# Patient Record
Sex: MALE
[De-identification: names, ages, dates, MRNs, and addresses within clinical notes are randomized per-mention and may not be internally consistent; named-entity substitution may affect disease eponyms.]

---

## 2018-04-02 ENCOUNTER — APPOINTMENT (OUTPATIENT)
Dept: SURGICAL ONCOLOGY | Facility: CLINIC | Age: 48
End: 2018-04-02
Payer: COMMERCIAL

## 2018-04-02 VITALS
HEART RATE: 79 BPM | DIASTOLIC BLOOD PRESSURE: 75 MMHG | OXYGEN SATURATION: 98 % | RESPIRATION RATE: 16 BRPM | BODY MASS INDEX: 42.66 KG/M2 | HEIGHT: 72 IN | SYSTOLIC BLOOD PRESSURE: 150 MMHG | WEIGHT: 315 LBS

## 2018-04-02 DIAGNOSIS — Z87.01 PERSONAL HISTORY OF PNEUMONIA (RECURRENT): ICD-10-CM

## 2018-04-02 DIAGNOSIS — Z80.1 FAMILY HISTORY OF MALIGNANT NEOPLASM OF TRACHEA, BRONCHUS AND LUNG: ICD-10-CM

## 2018-04-02 DIAGNOSIS — Z87.19 PERSONAL HISTORY OF OTHER DISEASES OF THE DIGESTIVE SYSTEM: ICD-10-CM

## 2018-04-02 DIAGNOSIS — Z87.891 PERSONAL HISTORY OF NICOTINE DEPENDENCE: ICD-10-CM

## 2018-04-02 DIAGNOSIS — R73.03 PREDIABETES.: ICD-10-CM

## 2018-04-02 DIAGNOSIS — Z80.8 FAMILY HISTORY OF MALIGNANT NEOPLASM OF OTHER ORGANS OR SYSTEMS: ICD-10-CM

## 2018-04-02 DIAGNOSIS — Z86.69 PERSONAL HISTORY OF OTHER DISEASES OF THE NERVOUS SYSTEM AND SENSE ORGANS: ICD-10-CM

## 2018-04-02 PROCEDURE — 99244 OFF/OP CNSLTJ NEW/EST MOD 40: CPT

## 2018-04-09 ENCOUNTER — RESULT REVIEW (OUTPATIENT)
Age: 48
End: 2018-04-09

## 2018-04-30 ENCOUNTER — OUTPATIENT (OUTPATIENT)
Dept: OUTPATIENT SERVICES | Facility: HOSPITAL | Age: 48
LOS: 1 days | End: 2018-04-30
Payer: COMMERCIAL

## 2018-04-30 VITALS
SYSTOLIC BLOOD PRESSURE: 150 MMHG | DIASTOLIC BLOOD PRESSURE: 70 MMHG | RESPIRATION RATE: 16 BRPM | WEIGHT: 315 LBS | HEART RATE: 88 BPM | HEIGHT: 72 IN | TEMPERATURE: 99 F | OXYGEN SATURATION: 98 %

## 2018-04-30 DIAGNOSIS — C43.9 MALIGNANT MELANOMA OF SKIN, UNSPECIFIED: ICD-10-CM

## 2018-04-30 DIAGNOSIS — D22.5 MELANOCYTIC NEVI OF TRUNK: Chronic | ICD-10-CM

## 2018-04-30 DIAGNOSIS — Z98.890 OTHER SPECIFIED POSTPROCEDURAL STATES: Chronic | ICD-10-CM

## 2018-04-30 DIAGNOSIS — Z01.818 ENCOUNTER FOR OTHER PREPROCEDURAL EXAMINATION: ICD-10-CM

## 2018-04-30 DIAGNOSIS — G47.33 OBSTRUCTIVE SLEEP APNEA (ADULT) (PEDIATRIC): ICD-10-CM

## 2018-04-30 DIAGNOSIS — C43.59 MALIGNANT MELANOMA OF OTHER PART OF TRUNK: ICD-10-CM

## 2018-04-30 LAB
ANION GAP SERPL CALC-SCNC: 12 MMOL/L — SIGNIFICANT CHANGE UP (ref 5–17)
BUN SERPL-MCNC: 13 MG/DL — SIGNIFICANT CHANGE UP (ref 7–23)
CALCIUM SERPL-MCNC: 10 MG/DL — SIGNIFICANT CHANGE UP (ref 8.4–10.5)
CHLORIDE SERPL-SCNC: 99 MMOL/L — SIGNIFICANT CHANGE UP (ref 96–108)
CO2 SERPL-SCNC: 25 MMOL/L — SIGNIFICANT CHANGE UP (ref 22–31)
CREAT SERPL-MCNC: 1.09 MG/DL — SIGNIFICANT CHANGE UP (ref 0.5–1.3)
GLUCOSE SERPL-MCNC: 97 MG/DL — SIGNIFICANT CHANGE UP (ref 70–99)
HCT VFR BLD CALC: 39.2 % — SIGNIFICANT CHANGE UP (ref 39–50)
HGB BLD-MCNC: 12.9 G/DL — LOW (ref 13–17)
LDH SERPL L TO P-CCNC: 183 U/L — SIGNIFICANT CHANGE UP (ref 50–242)
MCHC RBC-ENTMCNC: 29.1 PG — SIGNIFICANT CHANGE UP (ref 27–34)
MCHC RBC-ENTMCNC: 32.9 GM/DL — SIGNIFICANT CHANGE UP (ref 32–36)
MCV RBC AUTO: 88.3 FL — SIGNIFICANT CHANGE UP (ref 80–100)
PLATELET # BLD AUTO: 285 K/UL — SIGNIFICANT CHANGE UP (ref 150–400)
POTASSIUM SERPL-MCNC: 3.8 MMOL/L — SIGNIFICANT CHANGE UP (ref 3.5–5.3)
POTASSIUM SERPL-SCNC: 3.8 MMOL/L — SIGNIFICANT CHANGE UP (ref 3.5–5.3)
RBC # BLD: 4.44 M/UL — SIGNIFICANT CHANGE UP (ref 4.2–5.8)
RBC # FLD: 13.4 % — SIGNIFICANT CHANGE UP (ref 10.3–14.5)
SODIUM SERPL-SCNC: 136 MMOL/L — SIGNIFICANT CHANGE UP (ref 135–145)
WBC # BLD: 12.27 K/UL — HIGH (ref 3.8–10.5)
WBC # FLD AUTO: 12.27 K/UL — HIGH (ref 3.8–10.5)

## 2018-04-30 PROCEDURE — 83615 LACTATE (LD) (LDH) ENZYME: CPT

## 2018-04-30 PROCEDURE — 71046 X-RAY EXAM CHEST 2 VIEWS: CPT | Mod: 26

## 2018-04-30 PROCEDURE — 85027 COMPLETE CBC AUTOMATED: CPT

## 2018-04-30 PROCEDURE — G0463: CPT

## 2018-04-30 PROCEDURE — 80048 BASIC METABOLIC PNL TOTAL CA: CPT

## 2018-04-30 PROCEDURE — 71046 X-RAY EXAM CHEST 2 VIEWS: CPT

## 2018-04-30 RX ORDER — SODIUM CHLORIDE 9 MG/ML
3 INJECTION INTRAMUSCULAR; INTRAVENOUS; SUBCUTANEOUS EVERY 8 HOURS
Qty: 0 | Refills: 0 | Status: DISCONTINUED | OUTPATIENT
Start: 2018-06-06 | End: 2018-06-21

## 2018-04-30 RX ORDER — ACETAMINOPHEN 500 MG
1000 TABLET ORAL ONCE
Qty: 0 | Refills: 0 | Status: COMPLETED | OUTPATIENT
Start: 2018-06-06 | End: 2018-06-06

## 2018-04-30 RX ORDER — LIDOCAINE HCL 20 MG/ML
0.2 VIAL (ML) INJECTION ONCE
Qty: 0 | Refills: 0 | Status: DISCONTINUED | OUTPATIENT
Start: 2018-06-06 | End: 2018-06-21

## 2018-04-30 NOTE — H&P PST ADULT - PSH
Atypical nevus of back  3/2018   Excised:  mid back  Status post hernia repair  ' 2016   Umbilical with Mesh

## 2018-04-30 NOTE — H&P PST ADULT - HISTORY OF PRESENT ILLNESS
This is a 47 y/o male with no significant PMH.  Morbid obesity: BMI  44.1, +FARHAN per criteria, former smoker: 1ppd x 10 years. Quit '15.  Current dx: + Melanoma Mid-upper Back. Scheduled: Radical Resection of Melanoma on Back.

## 2018-04-30 NOTE — H&P PST ADULT - NSANTHOSAYNRD_GEN_A_CORE
No. FARHAN screening performed.  STOP BANG Legend: 0-2 = LOW Risk; 3-4 = INTERMEDIATE Risk; 5-8 = HIGH Risk Neck : 20 in./No. FARHAN screening performed.  STOP BANG Legend: 0-2 = LOW Risk; 3-4 = INTERMEDIATE Risk; 5-8 = HIGH Risk

## 2018-04-30 NOTE — H&P PST ADULT - PMH
Melanocytic nevus  3/2018   Atypical: excised mid-Back  Melanoma  dx: 3/2018    mid-upper Back  Obstructive sleep apnea syndrome in adult  per criteria  Umbilical hernia  ' 2016    surgically treated

## 2018-05-01 ENCOUNTER — APPOINTMENT (OUTPATIENT)
Dept: PLASTIC SURGERY | Facility: CLINIC | Age: 48
End: 2018-05-01
Payer: COMMERCIAL

## 2018-05-01 VITALS
WEIGHT: 315 LBS | SYSTOLIC BLOOD PRESSURE: 136 MMHG | HEART RATE: 93 BPM | HEIGHT: 72 IN | RESPIRATION RATE: 17 BRPM | DIASTOLIC BLOOD PRESSURE: 72 MMHG | BODY MASS INDEX: 42.66 KG/M2

## 2018-05-01 PROCEDURE — 99243 OFF/OP CNSLTJ NEW/EST LOW 30: CPT

## 2018-05-07 ENCOUNTER — APPOINTMENT (OUTPATIENT)
Dept: SURGICAL ONCOLOGY | Facility: CLINIC | Age: 48
End: 2018-05-07
Payer: COMMERCIAL

## 2018-05-07 VITALS
HEIGHT: 72 IN | DIASTOLIC BLOOD PRESSURE: 85 MMHG | RESPIRATION RATE: 16 BRPM | BODY MASS INDEX: 41.85 KG/M2 | WEIGHT: 309 LBS | OXYGEN SATURATION: 96 % | SYSTOLIC BLOOD PRESSURE: 146 MMHG | HEART RATE: 91 BPM

## 2018-05-07 DIAGNOSIS — R93.8 ABNORMAL FINDINGS ON DIAGNOSTIC IMAGING OF OTHER SPECIFIED BODY STRUCTURES: ICD-10-CM

## 2018-05-07 PROCEDURE — 99214 OFFICE O/P EST MOD 30 MIN: CPT

## 2018-05-09 ENCOUNTER — APPOINTMENT (OUTPATIENT)
Dept: SURGICAL ONCOLOGY | Facility: HOSPITAL | Age: 48
End: 2018-05-09

## 2018-05-29 ENCOUNTER — OUTPATIENT (OUTPATIENT)
Dept: OUTPATIENT SERVICES | Facility: HOSPITAL | Age: 48
LOS: 1 days | End: 2018-05-29
Payer: COMMERCIAL

## 2018-05-29 VITALS
SYSTOLIC BLOOD PRESSURE: 136 MMHG | OXYGEN SATURATION: 98 % | HEART RATE: 77 BPM | TEMPERATURE: 98 F | WEIGHT: 309.97 LBS | DIASTOLIC BLOOD PRESSURE: 85 MMHG | RESPIRATION RATE: 20 BRPM | HEIGHT: 72 IN

## 2018-05-29 DIAGNOSIS — C43.59 MALIGNANT MELANOMA OF OTHER PART OF TRUNK: ICD-10-CM

## 2018-05-29 DIAGNOSIS — G47.33 OBSTRUCTIVE SLEEP APNEA (ADULT) (PEDIATRIC): ICD-10-CM

## 2018-05-29 DIAGNOSIS — Z98.890 OTHER SPECIFIED POSTPROCEDURAL STATES: Chronic | ICD-10-CM

## 2018-05-29 DIAGNOSIS — Z01.818 ENCOUNTER FOR OTHER PREPROCEDURAL EXAMINATION: ICD-10-CM

## 2018-05-29 DIAGNOSIS — C43.9 MALIGNANT MELANOMA OF SKIN, UNSPECIFIED: ICD-10-CM

## 2018-05-29 DIAGNOSIS — J18.9 PNEUMONIA, UNSPECIFIED ORGANISM: ICD-10-CM

## 2018-05-29 DIAGNOSIS — D22.5 MELANOCYTIC NEVI OF TRUNK: Chronic | ICD-10-CM

## 2018-05-29 LAB
ANION GAP SERPL CALC-SCNC: 14 MMOL/L — SIGNIFICANT CHANGE UP (ref 5–17)
BUN SERPL-MCNC: 17 MG/DL — SIGNIFICANT CHANGE UP (ref 7–23)
CALCIUM SERPL-MCNC: 9.6 MG/DL — SIGNIFICANT CHANGE UP (ref 8.4–10.5)
CHLORIDE SERPL-SCNC: 106 MMOL/L — SIGNIFICANT CHANGE UP (ref 96–108)
CO2 SERPL-SCNC: 21 MMOL/L — LOW (ref 22–31)
CREAT SERPL-MCNC: 0.83 MG/DL — SIGNIFICANT CHANGE UP (ref 0.5–1.3)
GLUCOSE SERPL-MCNC: 101 MG/DL — HIGH (ref 70–99)
HCT VFR BLD CALC: 36.6 % — LOW (ref 39–50)
HGB BLD-MCNC: 12 G/DL — LOW (ref 13–17)
MCHC RBC-ENTMCNC: 29.3 PG — SIGNIFICANT CHANGE UP (ref 27–34)
MCHC RBC-ENTMCNC: 32.8 GM/DL — SIGNIFICANT CHANGE UP (ref 32–36)
MCV RBC AUTO: 89.5 FL — SIGNIFICANT CHANGE UP (ref 80–100)
PLATELET # BLD AUTO: 228 K/UL — SIGNIFICANT CHANGE UP (ref 150–400)
POTASSIUM SERPL-MCNC: 3.8 MMOL/L — SIGNIFICANT CHANGE UP (ref 3.5–5.3)
POTASSIUM SERPL-SCNC: 3.8 MMOL/L — SIGNIFICANT CHANGE UP (ref 3.5–5.3)
RBC # BLD: 4.09 M/UL — LOW (ref 4.2–5.8)
RBC # FLD: 14.2 % — SIGNIFICANT CHANGE UP (ref 10.3–14.5)
SODIUM SERPL-SCNC: 141 MMOL/L — SIGNIFICANT CHANGE UP (ref 135–145)
WBC # BLD: 6.2 K/UL — SIGNIFICANT CHANGE UP (ref 3.8–10.5)
WBC # FLD AUTO: 6.2 K/UL — SIGNIFICANT CHANGE UP (ref 3.8–10.5)

## 2018-05-29 PROCEDURE — 80048 BASIC METABOLIC PNL TOTAL CA: CPT

## 2018-05-29 PROCEDURE — 71046 X-RAY EXAM CHEST 2 VIEWS: CPT | Mod: 26

## 2018-05-29 PROCEDURE — 71046 X-RAY EXAM CHEST 2 VIEWS: CPT

## 2018-05-29 PROCEDURE — 85027 COMPLETE CBC AUTOMATED: CPT

## 2018-05-29 RX ORDER — FAMOTIDINE 10 MG/ML
0 INJECTION INTRAVENOUS
Qty: 0 | Refills: 0 | COMMUNITY

## 2018-05-29 NOTE — H&P PST ADULT - PSH
Atypical nevus of back  3/2018   Excised:  mid back  Status post hernia repair  2016   Umbilical with Mesh

## 2018-05-29 NOTE — H&P PST ADULT - NSANTHOSAYNRD_GEN_A_CORE
Neck : 20 in./No. FARHAN screening performed.  STOP BANG Legend: 0-2 = LOW Risk; 3-4 = INTERMEDIATE Risk; 5-8 = HIGH Risk Neck : 19.25 inches/No. FARHAN screening performed.  STOP BANG Legend: 0-2 = LOW Risk; 3-4 = INTERMEDIATE Risk; 5-8 = HIGH Risk

## 2018-05-29 NOTE — H&P PST ADULT - ATTENDING COMMENTS
D/w pt and wife.  Path slide review notes melanoma to be 1.1 mm in depth.  Given options of proceeding with resection of primary and returning for SLNB, vs canceling surgery today and rescheduling for both procedures to be done concurrently, pt wishes to proceed today with resection and will return for SLNB in 4 - 6 weeks.

## 2018-05-29 NOTE — H&P PST ADULT - HISTORY OF PRESENT ILLNESS
This is a 49 y/o male with no significant PMH.  Morbid obesity: BMI  44.1, +FARHAN per criteria, former smoker: 1ppd x 10 years. Quit '15.  Current dx: + Melanoma Mid-upper Back. Scheduled: Radical Resection of Melanoma on Back.     ****Pt initially was scheduled for May 2018, rescheduled due to a mass in right lobe, pt developed chills next day & was diagnosed with PNA - Nomi This is a 49 y/o male with no significant PMH.  Morbid obesity: BMI  44.1, +FARHAN per criteria, former smoker: 1ppd x 10 years. Quit '15.  Current dx: + Melanoma Mid-upper Back. Scheduled: Radical Resection of Melanoma on Back.     ****Pt initially was scheduled for May 2018, rescheduled due to a mass in right lobe, pt developed fever & chills next day & was diagnosed with PNA - Treated with Levaquin & prednisone. Repeat chest X-Ray  done in PST 5/29/18. *****

## 2018-05-29 NOTE — H&P PST ADULT - PMH
Melanocytic nevus  3/2018   Atypical: excised mid-Back  Melanoma  dx: 3/2018    mid-upper Back  Obstructive sleep apnea syndrome in adult  per criteria  Pneumonia  2016- hospitalized , April- may 2018- treated  Umbilical hernia  2016    surgically treated Melanocytic nevus  3/2018   Atypical: excised mid-Back  Melanoma  dx: 3/2018    mid-upper Back  Morbid obesity with BMI of 40.0-44.9, adult    Obstructive sleep apnea syndrome in adult  per criteria  Pneumonia  2016- hospitalized , April- may 2018- treated  Umbilical hernia  2016    surgically treated

## 2018-05-29 NOTE — H&P PST ADULT - RS GEN PE MLT RESP DETAILS PC
respirations non-labored/breath sounds equal/good air movement/clear to auscultation bilaterally/normal/airway patent

## 2018-06-05 ENCOUNTER — TRANSCRIPTION ENCOUNTER (OUTPATIENT)
Age: 48
End: 2018-06-05

## 2018-06-06 ENCOUNTER — OUTPATIENT (OUTPATIENT)
Dept: OUTPATIENT SERVICES | Facility: HOSPITAL | Age: 48
LOS: 1 days | End: 2018-06-06
Payer: COMMERCIAL

## 2018-06-06 ENCOUNTER — RESULT REVIEW (OUTPATIENT)
Age: 48
End: 2018-06-06

## 2018-06-06 ENCOUNTER — APPOINTMENT (OUTPATIENT)
Dept: SURGICAL ONCOLOGY | Facility: HOSPITAL | Age: 48
End: 2018-06-06

## 2018-06-06 VITALS
RESPIRATION RATE: 20 BRPM | SYSTOLIC BLOOD PRESSURE: 147 MMHG | DIASTOLIC BLOOD PRESSURE: 85 MMHG | HEIGHT: 72 IN | WEIGHT: 309.97 LBS | TEMPERATURE: 98 F | OXYGEN SATURATION: 98 % | HEART RATE: 77 BPM

## 2018-06-06 VITALS
TEMPERATURE: 97 F | OXYGEN SATURATION: 97 % | DIASTOLIC BLOOD PRESSURE: 76 MMHG | SYSTOLIC BLOOD PRESSURE: 142 MMHG | RESPIRATION RATE: 16 BRPM | HEART RATE: 64 BPM

## 2018-06-06 DIAGNOSIS — Z01.818 ENCOUNTER FOR OTHER PREPROCEDURAL EXAMINATION: ICD-10-CM

## 2018-06-06 DIAGNOSIS — Z98.890 OTHER SPECIFIED POSTPROCEDURAL STATES: Chronic | ICD-10-CM

## 2018-06-06 DIAGNOSIS — C43.59 MALIGNANT MELANOMA OF OTHER PART OF TRUNK: ICD-10-CM

## 2018-06-06 DIAGNOSIS — D22.5 MELANOCYTIC NEVI OF TRUNK: Chronic | ICD-10-CM

## 2018-06-06 PROCEDURE — 13102 CMPLX RPR TRUNK ADDL 5CM/<: CPT | Mod: LT

## 2018-06-06 PROCEDURE — 11602 EXC TR-EXT MAL+MARG 1.1-2 CM: CPT | Mod: LT

## 2018-06-06 PROCEDURE — 21935 RESECT BACK TUM < 5 CM: CPT

## 2018-06-06 PROCEDURE — 13101 CMPLX RPR TRUNK 2.6-7.5 CM: CPT | Mod: LT

## 2018-06-06 PROCEDURE — 13101 CMPLX RPR TRUNK 2.6-7.5 CM: CPT

## 2018-06-06 RX ORDER — CELECOXIB 200 MG/1
200 CAPSULE ORAL ONCE
Qty: 0 | Refills: 0 | Status: DISCONTINUED | OUTPATIENT
Start: 2018-06-06 | End: 2018-06-21

## 2018-06-06 RX ORDER — SODIUM CHLORIDE 9 MG/ML
1000 INJECTION, SOLUTION INTRAVENOUS
Qty: 0 | Refills: 0 | Status: DISCONTINUED | OUTPATIENT
Start: 2018-06-06 | End: 2018-06-21

## 2018-06-06 RX ORDER — ONDANSETRON 8 MG/1
4 TABLET, FILM COATED ORAL ONCE
Qty: 0 | Refills: 0 | Status: DISCONTINUED | OUTPATIENT
Start: 2018-06-06 | End: 2018-06-21

## 2018-06-06 RX ORDER — OXYCODONE HYDROCHLORIDE 5 MG/1
1 TABLET ORAL
Qty: 6 | Refills: 0 | OUTPATIENT
Start: 2018-06-06

## 2018-06-06 RX ORDER — OXYCODONE HYDROCHLORIDE 5 MG/1
5 TABLET ORAL ONCE
Qty: 0 | Refills: 0 | Status: DISCONTINUED | OUTPATIENT
Start: 2018-06-06 | End: 2018-06-06

## 2018-06-06 RX ORDER — CELECOXIB 200 MG/1
200 CAPSULE ORAL ONCE
Qty: 0 | Refills: 0 | Status: COMPLETED | OUTPATIENT
Start: 2018-06-06 | End: 2018-06-06

## 2018-06-06 RX ORDER — HYDROMORPHONE HYDROCHLORIDE 2 MG/ML
0.25 INJECTION INTRAMUSCULAR; INTRAVENOUS; SUBCUTANEOUS
Qty: 0 | Refills: 0 | Status: DISCONTINUED | OUTPATIENT
Start: 2018-06-06 | End: 2018-06-06

## 2018-06-06 RX ADMIN — Medication 1000 MILLIGRAM(S): at 06:12

## 2018-06-06 RX ADMIN — CELECOXIB 200 MILLIGRAM(S): 200 CAPSULE ORAL at 06:12

## 2018-06-06 NOTE — ASU PATIENT PROFILE, ADULT - PMH
Melanocytic nevus  3/2018   Atypical: excised mid-Back  Melanoma  dx: 3/2018    mid-upper Back  Morbid obesity with BMI of 40.0-44.9, adult    Obstructive sleep apnea syndrome in adult  per criteria  Pneumonia  2016- hospitalized , April- may 2018- treated  Umbilical hernia  2016    surgically treated

## 2018-06-06 NOTE — PRE-ANESTHESIA EVALUATION ADULT - MALLAMPATI CLASS
Class III - visualization of the soft palate and the base of the uvula Class III - visualization of the soft palate and the base of the uvula/upper front left broken incr spac low>up

## 2018-06-06 NOTE — ASU DISCHARGE PLAN (ADULT/PEDIATRIC). - ITEMS TO FOLLOWUP WITH YOUR PHYSICIAN'S
Please follow up with Dr. Bueno within 2 weeks of your discharge, please call outpatient office to set up your appointment.   Please follow up with Dr. Hansen on 06/19 as scheduled, please call office to confirm your appointment.

## 2018-06-06 NOTE — ASU DISCHARGE PLAN (ADULT/PEDIATRIC). - MEDICATION SUMMARY - MEDICATIONS TO TAKE
I will START or STAY ON the medications listed below when I get home from the hospital:    oxyCODONE 5 mg oral tablet  -- 1 tab(s) by mouth every 4 hours, As Needed MDD:6   -- Caution federal law prohibits the transfer of this drug to any person other  than the person for whom it was prescribed.  It is very important that you take or use this exactly as directed.  Do not skip doses or discontinue unless directed by your doctor.  May cause drowsiness.  Alcohol may intensify this effect.  Use care when operating dangerous machinery.  This prescription cannot be refilled.  Using more of this medication than prescribed may cause serious breathing problems.    -- Indication: For Post-OP pain

## 2018-06-06 NOTE — BRIEF OPERATIVE NOTE - PROCEDURE
<<-----Click on this checkbox to enter Procedure Wide excision of melanoma of back  06/06/2018    Active  [id number]

## 2018-06-06 NOTE — PRE-ANESTHESIA EVALUATION ADULT - NSANTHOSAYNRD_GEN_A_CORE
Neck : 19.25 inches/No. FARHAN screening performed.  STOP BANG Legend: 0-2 = LOW Risk; 3-4 = INTERMEDIATE Risk; 5-8 = HIGH Risk

## 2018-06-13 LAB — SURGICAL PATHOLOGY STUDY: SIGNIFICANT CHANGE UP

## 2018-06-22 ENCOUNTER — APPOINTMENT (OUTPATIENT)
Dept: SURGICAL ONCOLOGY | Facility: CLINIC | Age: 48
End: 2018-06-22
Payer: COMMERCIAL

## 2018-06-22 ENCOUNTER — APPOINTMENT (OUTPATIENT)
Dept: PLASTIC SURGERY | Facility: CLINIC | Age: 48
End: 2018-06-22
Payer: COMMERCIAL

## 2018-06-22 VITALS
HEIGHT: 72 IN | DIASTOLIC BLOOD PRESSURE: 85 MMHG | BODY MASS INDEX: 41.85 KG/M2 | HEART RATE: 61 BPM | OXYGEN SATURATION: 99 % | WEIGHT: 309 LBS | SYSTOLIC BLOOD PRESSURE: 137 MMHG

## 2018-06-22 PROCEDURE — 99024 POSTOP FOLLOW-UP VISIT: CPT

## 2018-06-25 ENCOUNTER — APPOINTMENT (OUTPATIENT)
Dept: SURGICAL ONCOLOGY | Facility: CLINIC | Age: 48
End: 2018-06-25

## 2018-07-11 ENCOUNTER — OUTPATIENT (OUTPATIENT)
Dept: OUTPATIENT SERVICES | Facility: HOSPITAL | Age: 48
LOS: 1 days | End: 2018-07-11
Payer: COMMERCIAL

## 2018-07-11 VITALS
TEMPERATURE: 98 F | DIASTOLIC BLOOD PRESSURE: 72 MMHG | HEIGHT: 72 IN | HEART RATE: 78 BPM | OXYGEN SATURATION: 97 % | WEIGHT: 309.97 LBS | RESPIRATION RATE: 16 BRPM | SYSTOLIC BLOOD PRESSURE: 130 MMHG

## 2018-07-11 DIAGNOSIS — D22.5 MELANOCYTIC NEVI OF TRUNK: Chronic | ICD-10-CM

## 2018-07-11 DIAGNOSIS — Z98.890 OTHER SPECIFIED POSTPROCEDURAL STATES: Chronic | ICD-10-CM

## 2018-07-11 DIAGNOSIS — Z01.818 ENCOUNTER FOR OTHER PREPROCEDURAL EXAMINATION: ICD-10-CM

## 2018-07-11 DIAGNOSIS — C43.59 MALIGNANT MELANOMA OF OTHER PART OF TRUNK: ICD-10-CM

## 2018-07-11 LAB
HCT VFR BLD CALC: 40.8 % — SIGNIFICANT CHANGE UP (ref 39–50)
HGB BLD-MCNC: 13.5 G/DL — SIGNIFICANT CHANGE UP (ref 13–17)
MCHC RBC-ENTMCNC: 29.1 PG — SIGNIFICANT CHANGE UP (ref 27–34)
MCHC RBC-ENTMCNC: 33.1 GM/DL — SIGNIFICANT CHANGE UP (ref 32–36)
MCV RBC AUTO: 87.9 FL — SIGNIFICANT CHANGE UP (ref 80–100)
PLATELET # BLD AUTO: 318 K/UL — SIGNIFICANT CHANGE UP (ref 150–400)
RBC # BLD: 4.64 M/UL — SIGNIFICANT CHANGE UP (ref 4.2–5.8)
RBC # FLD: 13.8 % — SIGNIFICANT CHANGE UP (ref 10.3–14.5)
WBC # BLD: 7.01 K/UL — SIGNIFICANT CHANGE UP (ref 3.8–10.5)
WBC # FLD AUTO: 7.01 K/UL — SIGNIFICANT CHANGE UP (ref 3.8–10.5)

## 2018-07-11 PROCEDURE — 85027 COMPLETE CBC AUTOMATED: CPT

## 2018-07-11 PROCEDURE — G0463: CPT

## 2018-07-11 RX ORDER — SODIUM CHLORIDE 9 MG/ML
3 INJECTION INTRAMUSCULAR; INTRAVENOUS; SUBCUTANEOUS EVERY 8 HOURS
Qty: 0 | Refills: 0 | Status: DISCONTINUED | OUTPATIENT
Start: 2018-07-17 | End: 2018-08-01

## 2018-07-11 RX ORDER — LIDOCAINE HCL 20 MG/ML
0.2 VIAL (ML) INJECTION ONCE
Qty: 0 | Refills: 0 | Status: DISCONTINUED | OUTPATIENT
Start: 2018-07-17 | End: 2018-08-01

## 2018-07-11 RX ORDER — CEFAZOLIN SODIUM 1 G
2000 VIAL (EA) INJECTION ONCE
Qty: 0 | Refills: 0 | Status: DISCONTINUED | OUTPATIENT
Start: 2018-07-17 | End: 2018-08-01

## 2018-07-11 RX ORDER — ACETAMINOPHEN 500 MG
1000 TABLET ORAL ONCE
Qty: 0 | Refills: 0 | Status: COMPLETED | OUTPATIENT
Start: 2018-07-17 | End: 2018-07-17

## 2018-07-11 NOTE — H&P PST ADULT - PSH
Atypical nevus of back  3/2018   Excised:  mid back  H/O melanoma excision  s/p removal -mid back , 06/2018  Status post hernia repair  2016   Umbilical with Mesh

## 2018-07-11 NOTE — H&P PST ADULT - HISTORY OF PRESENT ILLNESS
47 y/o male with no significant PMH other than treated for PNA (05/2018 prior to last surgery & repeat chest x ray was clear), Morbid obesity: BMI  44.2, +FARHAN per criteria, former smoker: 1ppd x 10 years. Quit '15, recently dagnosed with Melanoma Mid-upper Back, s/p Radical Resection of Melanoma on Back. now scheduled for mid back sentinel LN dissection on 07/17/18.

## 2018-07-11 NOTE — H&P PST ADULT - ATTENDING COMMENTS
D/w pt plan for Bilateral ALNB    Discussed r/b/a post op expectations poss complications.      Pt understands and agrees to proceed.

## 2018-07-11 NOTE — H&P PST ADULT - NSANTHOSAYNRD_GEN_A_CORE
Neck : 19 inches/No. FARHAN screening performed.  STOP BANG Legend: 0-2 = LOW Risk; 3-4 = INTERMEDIATE Risk; 5-8 = HIGH Risk

## 2018-07-16 ENCOUNTER — FORM ENCOUNTER (OUTPATIENT)
Age: 48
End: 2018-07-16

## 2018-07-17 ENCOUNTER — OUTPATIENT (OUTPATIENT)
Dept: OUTPATIENT SERVICES | Facility: HOSPITAL | Age: 48
LOS: 1 days | End: 2018-07-17
Payer: COMMERCIAL

## 2018-07-17 ENCOUNTER — RESULT REVIEW (OUTPATIENT)
Age: 48
End: 2018-07-17

## 2018-07-17 ENCOUNTER — APPOINTMENT (OUTPATIENT)
Dept: SURGICAL ONCOLOGY | Facility: HOSPITAL | Age: 48
End: 2018-07-17

## 2018-07-17 ENCOUNTER — APPOINTMENT (OUTPATIENT)
Dept: NUCLEAR MEDICINE | Facility: HOSPITAL | Age: 48
End: 2018-07-17

## 2018-07-17 VITALS
RESPIRATION RATE: 16 BRPM | OXYGEN SATURATION: 99 % | DIASTOLIC BLOOD PRESSURE: 72 MMHG | SYSTOLIC BLOOD PRESSURE: 138 MMHG | HEART RATE: 60 BPM

## 2018-07-17 VITALS
RESPIRATION RATE: 16 BRPM | TEMPERATURE: 98 F | HEIGHT: 72.01 IN | SYSTOLIC BLOOD PRESSURE: 122 MMHG | OXYGEN SATURATION: 98 % | WEIGHT: 309.97 LBS | HEART RATE: 60 BPM | DIASTOLIC BLOOD PRESSURE: 75 MMHG

## 2018-07-17 DIAGNOSIS — D22.5 MELANOCYTIC NEVI OF TRUNK: Chronic | ICD-10-CM

## 2018-07-17 DIAGNOSIS — Z01.818 ENCOUNTER FOR OTHER PREPROCEDURAL EXAMINATION: ICD-10-CM

## 2018-07-17 DIAGNOSIS — Z98.890 OTHER SPECIFIED POSTPROCEDURAL STATES: Chronic | ICD-10-CM

## 2018-07-17 DIAGNOSIS — C43.59 MALIGNANT MELANOMA OF OTHER PART OF TRUNK: ICD-10-CM

## 2018-07-17 PROBLEM — J18.9 PNEUMONIA, UNSPECIFIED ORGANISM: Chronic | Status: ACTIVE | Noted: 2018-05-29

## 2018-07-17 PROBLEM — K42.9 UMBILICAL HERNIA WITHOUT OBSTRUCTION OR GANGRENE: Chronic | Status: ACTIVE | Noted: 2018-04-30

## 2018-07-17 PROCEDURE — 88342 IMHCHEM/IMCYTCHM 1ST ANTB: CPT | Mod: 26

## 2018-07-17 PROCEDURE — 12032 INTMD RPR S/A/T/EXT 2.6-7.5: CPT | Mod: 58,59

## 2018-07-17 PROCEDURE — 38790 INJECT FOR LYMPHATIC X-RAY: CPT | Mod: 50,58,59

## 2018-07-17 PROCEDURE — 78195 LYMPH SYSTEM IMAGING: CPT | Mod: 26

## 2018-07-17 PROCEDURE — 38740 REMOVE ARMPIT LYMPH NODES: CPT | Mod: 50,58

## 2018-07-17 PROCEDURE — 38900 IO MAP OF SENT LYMPH NODE: CPT | Mod: 50

## 2018-07-17 PROCEDURE — 88307 TISSUE EXAM BY PATHOLOGIST: CPT | Mod: 26

## 2018-07-17 PROCEDURE — 88307 TISSUE EXAM BY PATHOLOGIST: CPT

## 2018-07-17 PROCEDURE — 88341 IMHCHEM/IMCYTCHM EA ADD ANTB: CPT | Mod: 26

## 2018-07-17 PROCEDURE — 88342 IMHCHEM/IMCYTCHM 1ST ANTB: CPT

## 2018-07-17 PROCEDURE — 38525 BIOPSY/REMOVAL LYMPH NODES: CPT | Mod: 50

## 2018-07-17 PROCEDURE — 78195 LYMPH SYSTEM IMAGING: CPT

## 2018-07-17 PROCEDURE — A9541: CPT

## 2018-07-17 PROCEDURE — 88341 IMHCHEM/IMCYTCHM EA ADD ANTB: CPT

## 2018-07-17 PROCEDURE — 38792 RA TRACER ID OF SENTINL NODE: CPT | Mod: 50,58,59

## 2018-07-17 RX ORDER — OXYCODONE HYDROCHLORIDE 5 MG/1
1 TABLET ORAL
Qty: 16 | Refills: 0 | OUTPATIENT
Start: 2018-07-17 | End: 2018-07-20

## 2018-07-17 RX ORDER — SODIUM CHLORIDE 9 MG/ML
1000 INJECTION, SOLUTION INTRAVENOUS
Qty: 0 | Refills: 0 | Status: DISCONTINUED | OUTPATIENT
Start: 2018-07-17 | End: 2018-08-01

## 2018-07-17 RX ORDER — CELECOXIB 200 MG/1
200 CAPSULE ORAL ONCE
Qty: 0 | Refills: 0 | Status: COMPLETED | OUTPATIENT
Start: 2018-07-17 | End: 2018-07-17

## 2018-07-17 RX ORDER — OXYCODONE AND ACETAMINOPHEN 5; 325 MG/1; MG/1
5-325 TABLET ORAL
Qty: 40 | Refills: 0 | Status: ACTIVE | COMMUNITY
Start: 2018-07-17 | End: 1900-01-01

## 2018-07-17 RX ORDER — OXYCODONE HYDROCHLORIDE 5 MG/1
5 TABLET ORAL ONCE
Qty: 0 | Refills: 0 | Status: DISCONTINUED | OUTPATIENT
Start: 2018-07-17 | End: 2018-07-17

## 2018-07-17 RX ORDER — ONDANSETRON 8 MG/1
4 TABLET, FILM COATED ORAL ONCE
Qty: 0 | Refills: 0 | Status: DISCONTINUED | OUTPATIENT
Start: 2018-07-17 | End: 2018-08-01

## 2018-07-17 RX ORDER — CELECOXIB 200 MG/1
1 CAPSULE ORAL
Qty: 0 | Refills: 0 | COMMUNITY
Start: 2018-07-17

## 2018-07-17 RX ORDER — OXYCODONE HYDROCHLORIDE 5 MG/1
1 TABLET ORAL
Qty: 0 | Refills: 0 | COMMUNITY
Start: 2018-07-17

## 2018-07-17 RX ORDER — CELECOXIB 200 MG/1
200 CAPSULE ORAL ONCE
Qty: 0 | Refills: 0 | Status: DISCONTINUED | OUTPATIENT
Start: 2018-07-17 | End: 2018-08-01

## 2018-07-17 RX ADMIN — Medication 1000 MILLIGRAM(S): at 09:54

## 2018-07-17 RX ADMIN — CELECOXIB 200 MILLIGRAM(S): 200 CAPSULE ORAL at 09:54

## 2018-07-17 NOTE — BRIEF OPERATIVE NOTE - PROCEDURE
<<-----Click on this checkbox to enter Procedure [place] node mapping with lymph node dissection  07/18/2018    Active  LDEMYAN

## 2018-07-23 LAB — SURGICAL PATHOLOGY STUDY: SIGNIFICANT CHANGE UP

## 2018-07-25 PROBLEM — E66.01 MORBID (SEVERE) OBESITY DUE TO EXCESS CALORIES: Chronic | Status: ACTIVE | Noted: 2018-05-29

## 2018-07-25 PROBLEM — G47.33 OBSTRUCTIVE SLEEP APNEA (ADULT) (PEDIATRIC): Chronic | Status: ACTIVE | Noted: 2018-04-30

## 2018-07-25 PROBLEM — D22.9 MELANOCYTIC NEVI, UNSPECIFIED: Chronic | Status: ACTIVE | Noted: 2018-04-30

## 2018-07-25 PROBLEM — C43.9 MALIGNANT MELANOMA OF SKIN, UNSPECIFIED: Chronic | Status: ACTIVE | Noted: 2018-04-30

## 2018-08-02 ENCOUNTER — APPOINTMENT (OUTPATIENT)
Dept: SURGICAL ONCOLOGY | Facility: CLINIC | Age: 48
End: 2018-08-02
Payer: COMMERCIAL

## 2018-08-02 VITALS
HEIGHT: 72 IN | HEART RATE: 71 BPM | RESPIRATION RATE: 15 BRPM | WEIGHT: 309 LBS | BODY MASS INDEX: 41.85 KG/M2 | DIASTOLIC BLOOD PRESSURE: 81 MMHG | SYSTOLIC BLOOD PRESSURE: 160 MMHG

## 2018-08-02 PROCEDURE — 99024 POSTOP FOLLOW-UP VISIT: CPT

## 2018-09-10 ENCOUNTER — APPOINTMENT (OUTPATIENT)
Dept: SURGICAL ONCOLOGY | Facility: CLINIC | Age: 48
End: 2018-09-10
Payer: COMMERCIAL

## 2018-09-10 VITALS
WEIGHT: 312 LBS | HEIGHT: 72 IN | OXYGEN SATURATION: 98 % | SYSTOLIC BLOOD PRESSURE: 133 MMHG | DIASTOLIC BLOOD PRESSURE: 89 MMHG | BODY MASS INDEX: 42.26 KG/M2 | HEART RATE: 65 BPM

## 2018-09-10 PROCEDURE — 99024 POSTOP FOLLOW-UP VISIT: CPT

## 2019-03-11 ENCOUNTER — APPOINTMENT (OUTPATIENT)
Dept: SURGICAL ONCOLOGY | Facility: CLINIC | Age: 49
End: 2019-03-11
Payer: COMMERCIAL

## 2019-03-11 VITALS
RESPIRATION RATE: 16 BRPM | BODY MASS INDEX: 42.26 KG/M2 | OXYGEN SATURATION: 97 % | WEIGHT: 312 LBS | SYSTOLIC BLOOD PRESSURE: 153 MMHG | DIASTOLIC BLOOD PRESSURE: 74 MMHG | HEART RATE: 70 BPM | HEIGHT: 72 IN

## 2019-03-11 PROCEDURE — 99214 OFFICE O/P EST MOD 30 MIN: CPT

## 2019-03-11 NOTE — CONSULT LETTER
[Dear  ___] : Dear  [unfilled], [Consult Letter:] : I had the pleasure of evaluating your patient, [unfilled]. [Please see my note below.] : Please see my note below. [Consult Closing:] : Thank you very much for allowing me to participate in the care of this patient.  If you have any questions, please do not hesitate to contact me. [Sincerely,] : Sincerely, [DrRitu  ___] : Dr. RUSH [FreeTextEntry2] : Francois Spivey MD \par  [FreeTextEntry1] : Rogelio is a pleasant 49 year old male who presents for a follow up exam.   Rogelio underwent a shave biopsy which revealed an atypical melanocytic nevus and then subsequently underwent excision on 3/14/18. Final pathology revealed a melanoma, approximately 0.7 mm in thickness, arising in association with a compound melanocytic nevus with unusual features, no ulceration or regression, no lymphvascular invasion. Primary tumor staging: pT1a.\par \par 4/30/18 - Preop CXR revealed a 1.7 x 3.1 cm mass in the right mid to upper lung zone. CT chest is recommended. He completed a PET/CT on 5/5/19 which showed pulmonary findings likely of infectious etiology for which short term follow up was advised. \par \par I performed a radical resection of mid back melanoma with plastics closure on 6/6/18. Final pathology revealed no residual melanoma, scar consistent with prior surgical procedure, negative margins. Upon slide review, however, the index melanoma lesion was found to be 1.1 mm in thickness requiring sentinel node biopsies on 7/17/18. Final pathology revealed 8 negative left axillary nodes and 4 negative right axillary nodes. \par \par PMH notable pneumonia with sepsis 2016, blurry vision, pre-diabetic, umbilical hernia repair 2017, tonsillectomy. \par Former smoker (1 ppd x 25 years, quit 2016), denies alcohol use\par \par On exam, the mid back incision is well-healed. There is no evidence of local recurrence, satellitosis, or metastases in transit. There is no regional lymphadenopathy.\par \par Rogelio will follow up with me in 6 months upon completion of serum LDH and a chest x-ray. He'll continue his ongoing dermatologic and ophthalmologic surveillance. [FreeTextEntry3] : Lebron Bueno MD\par Surgical Oncology\par [facility]\par Office: [phone]\par Cell: [phone]

## 2019-03-11 NOTE — ASSESSMENT
[FreeTextEntry1] : Rogelio will follow up with me in 6 months upon completion of serum LDH and a chest x-ray. He'll continue his ongoing dermatologic and ophthalmologic surveillance.

## 2019-09-08 NOTE — ASU PREOP CHECKLIST - WAS PATIENT ON BETA BLOCKER?
Ortho Note    Pt comfortable without complaints, pain controlled  Denies CP, SOB, N/V, numbness/tingling     Vital Signs Last 24 Hrs  T(C): 37.1 (09-08-19 @ 05:37), Max: 37.1 (09-08-19 @ 05:37)  T(F): 98.7 (09-08-19 @ 05:37), Max: 98.7 (09-08-19 @ 05:37)  HR: 75 (09-08-19 @ 05:37) (75 - 75)  BP: 159/87 (09-08-19 @ 05:37) (159/87 - 159/87)  BP(mean): --  RR: 17 (09-08-19 @ 05:37) (17 - 17)  SpO2: 94% (09-08-19 @ 05:37) (94% - 94%)  AVSS    General: Pt Alert and oriented, NAD  Neck DSG C/D/I  Pulses: 2+ radial BL  Sensation: C5-T1 intact BL  Motor: AIN/PIN/U intact BL      A/P: 47yMale s/p ACDF C5-C7  - Stable  - Pain Control  - DVT ppx: SCDs  - PT, WBS: WBAT  - dispo home today     Ortho Pager [phone] No

## 2019-09-16 ENCOUNTER — APPOINTMENT (OUTPATIENT)
Age: 49
End: 2019-09-16
Payer: COMMERCIAL

## 2019-09-16 VITALS
RESPIRATION RATE: 15 BRPM | OXYGEN SATURATION: 96 % | SYSTOLIC BLOOD PRESSURE: 144 MMHG | HEIGHT: 72 IN | HEART RATE: 69 BPM | DIASTOLIC BLOOD PRESSURE: 96 MMHG

## 2019-09-16 DIAGNOSIS — C43.59 MALIGNANT MELANOMA OF OTHER PART OF TRUNK: ICD-10-CM

## 2019-09-16 PROCEDURE — 99214 OFFICE O/P EST MOD 30 MIN: CPT

## 2019-09-16 NOTE — PHYSICAL EXAM
[Normal] : supple, no neck mass and thyroid not enlarged [Normal Neck Lymph Nodes] : normal neck lymph nodes  [Normal Supraclavicular Lymph Nodes] : normal supraclavicular lymph nodes [Normal] : oriented to person, place and time, with appropriate affect [de-identified] : Mid upper back incision well healed without signs of local recurrence.

## 2019-09-16 NOTE — ASSESSMENT
[FreeTextEntry1] : Rogelio will follow up with me in 6 months.  He will contact me upon completion of serum LDH and a chest x-ray in the near future. He'll continue his ongoing dermatologic and ophthalmologic surveillance.

## 2019-09-16 NOTE — HISTORY OF PRESENT ILLNESS
[de-identified] : Rogelio is a pleasant 49 year old male who presents for a follow up exam. \par \par Initially presented on 4/2/18 for newly diagnosed melanoma. He was referred to our office by Dr. Francois Spivey. He states he had a pruritic mole on his mid upper back for a few months and sought dermatological surveillance. He states he first underwent a shave biopsy which revealed an atypical melanocytic nevus and then subsequently underwent excision on 3/14/18. Final pathology revealed a MALIGNANT MELANOMA, approximately 0.7 mm in thickness, arising in association with a compound melanocytic nevus with unusual features, no ulceration or regression, no lymphvascular invasion. Primary tumor staging: pT1a\par \par 4/30/18 - Preop CXR revealed a 1.7 x 3.1 cm mass in the right mid to upper lung zone. CT chest is recommended. He completed a PET/CT on 5/5/19 which showed pulmonary findings likely of infectious etiology for which short term follow up was advised. \par \par He underwent a radical resection of mid back melanoma and plastics closure by Dr. Driver on 6/6/18. Final pathology revealed no residual melanoma, scar consistent with prior surgical procedure, negative margins. Upon slide review, the lesion was found to be 1.1 mm in thickness and thus we scheduled sentinel node biopsies.\par \par He is now status post bilateral axillary sentinel lymph node biopsies on 7/17/18. Final pathology revealed 8 negative left axillary nodes and 4 negative right axillary nodes. \par \par He does not have any complaints/pain. He does not detect any new masses or skin lesions and has been avoiding sun.  Rogelio continues to see the dermatologist for skin surveillance.  He has not undergone his surveillance CXR or LDH level as ordered.\par \par PMH notable pneumonia with sepsis 2016, blurry vision, pre-diabetic, umbilical hernia repair 2017, tonsillectomy. \par Former smoker (1 ppd x 25 years, quit 2016), denies alcohol use\par \par Internist: Dr. Joby Carias ([place])\par Dermatologist: Dr. Francois Spivey.

## 2019-09-16 NOTE — CONSULT LETTER
[Dear  ___] : Dear  [unfilled], [Consult Letter:] : I had the pleasure of evaluating your patient, [unfilled]. [Please see my note below.] : Please see my note below. [Consult Closing:] : Thank you very much for allowing me to participate in the care of this patient.  If you have any questions, please do not hesitate to contact me. [Sincerely,] : Sincerely, [DrRitu  ___] : Dr. RUSH [FreeTextEntry2] : Francois Spivey MD \par  [FreeTextEntry1] : Rogelio is a pleasant 49 year old male who presents for a follow up exam. \par \par Initially presented on 4/2/18 for newly diagnosed melanoma. He was referred to our office by Dr. Francois Spivey. He states he had a pruritic mole on his mid upper back for a few months and sought dermatological surveillance. He states he first underwent a shave biopsy which revealed an atypical melanocytic nevus and then subsequently underwent excision on 3/14/18. Final pathology revealed a MALIGNANT MELANOMA, approximately 0.7 mm in thickness, arising in association with a compound melanocytic nevus with unusual features, no ulceration or regression, no lymphvascular invasion. Primary tumor staging: pT1a\par \par 4/30/18 - Preop CXR revealed a 1.7 x 3.1 cm mass in the right mid to upper lung zone. CT chest is recommended. He completed a PET/CT on 5/5/19 which showed pulmonary findings likely of infectious etiology for which short term follow up was advised. \par \par He underwent a radical resection of mid back melanoma and plastics closure by Dr. Driver on 6/6/18. Final pathology revealed no residual melanoma, scar consistent with prior surgical procedure, negative margins. Upon slide review, the lesion was found to be 1.1 mm in thickness and thus we scheduled sentinel node biopsies.\par \par He is now status post bilateral axillary sentinel lymph node biopsies on 7/17/18. Final pathology revealed 8 negative left axillary nodes and 4 negative right axillary nodes. \par \par He does not have any complaints/pain. He does not detect any new masses or skin lesions and has been avoiding sun.  Rogelio continues to see the dermatologist for skin surveillance.  He has not undergone his surveillance CXR or LDH level as ordered.\par \par PMH notable pneumonia with sepsis 2016, blurry vision, pre-diabetic, umbilical hernia repair 2017, tonsillectomy. \par Former smoker (1 ppd x 25 years, quit 2016), denies alcohol use\par \par On exam,the mid back incision is well-healed. There is no evidence of local recurrence, satellitosis, or metastases in transit. There is no regional lymphadenopathy.\par \par Rogelio will follow up with me in 6 months.  He will contact me upon completion of serum LDH and a chest x-ray in the near future. He'll continue his ongoing dermatologic and ophthalmologic surveillance.  [FreeTextEntry3] : Lebron Bueno MD\par Surgical Oncology\par [facility]\par Office: [phone]\par Cell: [phone]

## 2020-03-16 ENCOUNTER — APPOINTMENT (OUTPATIENT)
Dept: SURGICAL ONCOLOGY | Facility: CLINIC | Age: 50
End: 2020-03-16

## 2021-05-13 NOTE — RESULTS/DATA
Need to call pt to reschedule&/OR just inform them their mri is actually for Tuesday 05/18. Pt under current impression they are getting mri done on 05/17 but there is no mri availability that date. I missed this error when scheduling them in office and did not realize the date switched to the 18th when I set it for the 17th. Could not reach pt at either number and no VM box set up for either. Will continue to call and try and reach pt to fix my error. [FreeTextEntry1] : Recent imaging and labs reviewed.

## 2021-07-30 NOTE — H&P PST ADULT - CIGARETTES, NUMBER OF YRS
Procedure:  CARPAL TUNNEL RELEASE; Dequervains release (Left Wrist)    Relevant Problems   CARDIO   (+) Chest pain      MUSCULOSKELETAL   (+) Chronic bilateral low back pain without sciatica   (+) Thoracic back pain        Physical Exam    Airway    Mallampati score: II  TM Distance: >3 FB  Neck ROM: full     Dental   No notable dental hx     Cardiovascular  Rhythm: regular, Rate: normal, Cardiovascular exam normal    Pulmonary  Pulmonary exam normal Breath sounds clear to auscultation,     Other Findings        Anesthesia Plan  ASA Score- 2     Anesthesia Type- IV sedation with anesthesia with ASA Monitors  Additional Monitors:   Airway Plan:           Plan Factors-    Chart reviewed  Existing labs reviewed  Patient summary reviewed  Patient is not a current smoker  Patient instructed to abstain from smoking on day of procedure  Patient did not smoke on day of surgery  Induction- intravenous  Postoperative Plan-     Informed Consent- Anesthetic plan and risks discussed with patient and spouse (Ed) 
10

## 2022-09-15 ENCOUNTER — APPOINTMENT (OUTPATIENT)
Dept: ORTHOPEDIC SURGERY | Facility: CLINIC | Age: 52
End: 2022-09-15

## 2022-09-15 DIAGNOSIS — M25.672 STIFFNESS OF LEFT ANKLE, NOT ELSEWHERE CLASSIFIED: ICD-10-CM

## 2022-09-15 DIAGNOSIS — Z00.00 ENCOUNTER FOR GENERAL ADULT MEDICAL EXAMINATION W/OUT ABNORMAL FINDINGS: ICD-10-CM

## 2022-09-15 DIAGNOSIS — M25.669 STIFFNESS OF UNSPECIFIED KNEE, NOT ELSEWHERE CLASSIFIED: ICD-10-CM

## 2022-09-15 DIAGNOSIS — M72.2 PLANTAR FASCIAL FIBROMATOSIS: ICD-10-CM

## 2022-09-15 DIAGNOSIS — M17.0 BILATERAL PRIMARY OSTEOARTHRITIS OF KNEE: ICD-10-CM

## 2022-09-15 PROCEDURE — 99204 OFFICE O/P NEW MOD 45 MIN: CPT

## 2022-09-15 PROCEDURE — 73562 X-RAY EXAM OF KNEE 3: CPT | Mod: 50

## 2022-09-15 PROCEDURE — L1902: CPT | Mod: LT

## 2022-09-15 PROCEDURE — 73630 X-RAY EXAM OF FOOT: CPT | Mod: LT

## 2022-09-15 RX ORDER — MELOXICAM 15 MG/1
15 TABLET ORAL DAILY
Qty: 30 | Refills: 3 | Status: COMPLETED | COMMUNITY
Start: 2022-09-15 | End: 2023-01-13

## 2022-09-15 NOTE — ASSESSMENT
[FreeTextEntry1] : Patient will start PT for all areas.\par Icing encouraged.\par  Airheel  is recommended.\par Mobic prescribed.\par \par The patient was explained the options as well as benefits of over the counter verses prescription strength nonsteroidal anti-inflammatory medication. The patient opts for a prescription strength medication.\par

## 2022-09-15 NOTE — PHYSICAL EXAM
[Bilateral] : knee bilaterally [AP] : anteroposterior [Lateral] : lateral [[place]] : skyline [FreeTextEntry9] : Mild OA changes with slight medial joint space narrowing left greater than right. [5___] : dorsiflexion 5[unfilled]/5 [4___] : eversion 4[unfilled]/5 [2+] : posterior tibialis pulse: 2+ [Normal] : saphenous nerve sensation normal [] : patient ambulates without assistive device [Left] : left foot [Weight -] : weightbearing [There are no fractures, subluxations or dislocations. No significant abnormalities are seen] : There are no fractures, subluxations or dislocations. No significant abnormalities are seen [de-identified] : small achilles insertiona spur [de-identified] : plantar flexion 30 degrees [de-identified] : inversion 20 degrees [de-identified] : eversion 15 degrees [TWNoteComboBox7] : dorsiflexion 10 degrees

## 2022-09-15 NOTE — HISTORY OF PRESENT ILLNESS
[8] : 8 [7] : 7 [Dull/Aching] : dull/aching [Localized] : localized [Intermittent] : intermittent [Household chores] : household chores [Leisure] : leisure [Work] : work [Social interactions] : social interactions [Rest] : rest [Sitting] : sitting [Standing] : standing [Walking] : walking [Exercising] : exercising [Stairs] : stairs [de-identified] : Pt is a 52 year old M who presents today for evaluation of his left foot. He has been having plantar heel pain since mid August, 2022.  No trauma.  No numbness/tingling. Ice to affected area. No recent formal treatment to date. WB in sneakers.  No prior left foot issues.  He also complains of bilateral knee pain left greater than right since August. [] : Post Surgical Visit: no [FreeTextEntry1] : L foot

## 2023-02-24 NOTE — ASU DISCHARGE PLAN (ADULT/PEDIATRIC). - MEDICATION SUMMARY - MEDICATIONS TO TAKE
[Initial Eval - Existing Diagnosis] : an initial evaluation of an existing diagnosis [Sleep Apnea] : sleep apnea [Spouse] : spouse I will START or STAY ON the medications listed below when I get home from the hospital:    celecoxib 200 mg oral capsule  -- 1 cap(s) by mouth once  -- Indication: For pain    oxyCODONE 5 mg oral tablet  -- 1-2 tab(s) by mouth every 6-8 hours, As Needed -Moderate to Severe Pain MDD:6  -- Indication: For pain
